# Patient Record
Sex: MALE | Race: WHITE | NOT HISPANIC OR LATINO | URBAN - METROPOLITAN AREA
[De-identification: names, ages, dates, MRNs, and addresses within clinical notes are randomized per-mention and may not be internally consistent; named-entity substitution may affect disease eponyms.]

---

## 2021-04-15 ENCOUNTER — OUTPATIENT (OUTPATIENT)
Dept: OUTPATIENT SERVICES | Facility: HOSPITAL | Age: 51
LOS: 1 days | End: 2021-04-15

## 2021-04-15 VITALS
WEIGHT: 259.93 LBS | OXYGEN SATURATION: 98 % | TEMPERATURE: 98 F | RESPIRATION RATE: 14 BRPM | HEART RATE: 84 BPM | DIASTOLIC BLOOD PRESSURE: 75 MMHG | HEIGHT: 71 IN | SYSTOLIC BLOOD PRESSURE: 110 MMHG

## 2021-04-15 DIAGNOSIS — M75.42 IMPINGEMENT SYNDROME OF LEFT SHOULDER: ICD-10-CM

## 2021-04-15 DIAGNOSIS — M25.512 PAIN IN LEFT SHOULDER: ICD-10-CM

## 2021-04-15 DIAGNOSIS — Z01.812 ENCOUNTER FOR PREPROCEDURAL LABORATORY EXAMINATION: ICD-10-CM

## 2021-04-15 LAB
ANION GAP SERPL CALC-SCNC: 16 MMOL/L — HIGH (ref 7–14)
BUN SERPL-MCNC: 17 MG/DL — SIGNIFICANT CHANGE UP (ref 7–23)
CALCIUM SERPL-MCNC: 10.2 MG/DL — SIGNIFICANT CHANGE UP (ref 8.4–10.5)
CHLORIDE SERPL-SCNC: 102 MMOL/L — SIGNIFICANT CHANGE UP (ref 98–107)
CO2 SERPL-SCNC: 22 MMOL/L — SIGNIFICANT CHANGE UP (ref 22–31)
CREAT SERPL-MCNC: 0.96 MG/DL — SIGNIFICANT CHANGE UP (ref 0.5–1.3)
GLUCOSE SERPL-MCNC: 70 MG/DL — SIGNIFICANT CHANGE UP (ref 70–99)
HCT VFR BLD CALC: 47.3 % — SIGNIFICANT CHANGE UP (ref 39–50)
HGB BLD-MCNC: 15.2 G/DL — SIGNIFICANT CHANGE UP (ref 13–17)
MCHC RBC-ENTMCNC: 29.5 PG — SIGNIFICANT CHANGE UP (ref 27–34)
MCHC RBC-ENTMCNC: 32.1 GM/DL — SIGNIFICANT CHANGE UP (ref 32–36)
MCV RBC AUTO: 91.8 FL — SIGNIFICANT CHANGE UP (ref 80–100)
NRBC # BLD: 0 /100 WBCS — SIGNIFICANT CHANGE UP
NRBC # FLD: 0 K/UL — SIGNIFICANT CHANGE UP
PLATELET # BLD AUTO: 233 K/UL — SIGNIFICANT CHANGE UP (ref 150–400)
POTASSIUM SERPL-MCNC: 3.9 MMOL/L — SIGNIFICANT CHANGE UP (ref 3.5–5.3)
POTASSIUM SERPL-SCNC: 3.9 MMOL/L — SIGNIFICANT CHANGE UP (ref 3.5–5.3)
RBC # BLD: 5.15 M/UL — SIGNIFICANT CHANGE UP (ref 4.2–5.8)
RBC # FLD: 12.1 % — SIGNIFICANT CHANGE UP (ref 10.3–14.5)
SODIUM SERPL-SCNC: 140 MMOL/L — SIGNIFICANT CHANGE UP (ref 135–145)
WBC # BLD: 10.73 K/UL — HIGH (ref 3.8–10.5)
WBC # FLD AUTO: 10.73 K/UL — HIGH (ref 3.8–10.5)

## 2021-04-15 NOTE — H&P PST ADULT - HISTORY OF PRESENT ILLNESS
51y/o male presents for prop eval for Scheduled for left shoulder arthroscopy debridement subacromial decompression distal clavicle resection on 4/27/2021.  Pt state had work related injury on 6/19/2020 to left shoulder.   Dx with impingement syndrome of left shoulder.  States hd physical therapy with minimal improvement. 49y/o male presents for prop eval for Scheduled for left shoulder arthroscopy debridement subacromial decompression distal clavicle resection on 4/27/2021.  Pt state had work related injury on 6/19/2020 to left shoulder.   Dx with impingement syndrome of left shoulder.  States had physical therapy with minimal improvement.

## 2021-04-15 NOTE — H&P PST ADULT - BP NONINVASIVE MEAN (MM HG)
Post Op Patient called for either Mick Whitt or NP Buttery. Patient said that Efren only gave her 6 pills of the Hydrocodone Medication. The pharmacy told the pt that they need AIDEN Santos to get preauthorized for the Hydrocodone medication or they will not fill the medication. Patient said she only has one more pill of Hydrocodone for tonight. [facility] [phone]. Patient tel. [phone]. 86

## 2021-04-15 NOTE — H&P PST ADULT - NSICDXPROBLEM_GEN_ALL_CORE_FT
PROBLEM DIAGNOSES  Problem: Encounter for preprocedure screening laboratory testing for COVID-19  Assessment and Plan: per pt scheduled within 72 hrs of this surgery     Problem: Impingement syndrome of left shoulder  Assessment and Plan: Scheduled for left shoulder arthroscopy debridement subacromial decompression distal clavicle resection on 4/27/2021  Written & verbal preop instructions, gi prophylaxis & surgical soap given  Pt verbalized good understanding.  Teach back done on surgical soap instructions.  OS precautions recommended.  OR booking notified via fax.          PROBLEM DIAGNOSES  Problem: Encounter for preprocedure screening laboratory testing for COVID-19  Assessment and Plan: per pt scheduled within 72 hrs of this surgery     Problem: Impingement syndrome of left shoulder  Assessment and Plan: Scheduled for left shoulder arthroscopy debridement subacromial decompression distal clavicle resection on 4/27/2021  Written & verbal preop instructions, gi prophylaxis & surgical soap given  Pt verbalized good understanding.  Teach back done on surgical soap instructions.  Nery precautions recommended.  OR booking notified via fax.

## 2021-04-15 NOTE — H&P PST ADULT - NEGATIVE GENERAL GENITOURINARY SYMPTOMS
no renal colic/no flank pain L/no flank pain R/no bladder infections/no dysuria/normal urinary frequency

## 2021-04-24 ENCOUNTER — APPOINTMENT (OUTPATIENT)
Dept: DISASTER EMERGENCY | Facility: CLINIC | Age: 51
End: 2021-04-24

## 2021-04-24 ENCOUNTER — LABORATORY RESULT (OUTPATIENT)
Age: 51
End: 2021-04-24

## 2021-04-24 DIAGNOSIS — Z01.818 ENCOUNTER FOR OTHER PREPROCEDURAL EXAMINATION: ICD-10-CM

## 2021-04-26 ENCOUNTER — TRANSCRIPTION ENCOUNTER (OUTPATIENT)
Age: 51
End: 2021-04-26

## 2021-04-26 VITALS
SYSTOLIC BLOOD PRESSURE: 110 MMHG | WEIGHT: 259.93 LBS | TEMPERATURE: 98 F | DIASTOLIC BLOOD PRESSURE: 75 MMHG | RESPIRATION RATE: 14 BRPM | HEART RATE: 84 BPM | OXYGEN SATURATION: 98 % | HEIGHT: 71 IN

## 2021-04-27 ENCOUNTER — OUTPATIENT (OUTPATIENT)
Dept: OUTPATIENT SERVICES | Facility: HOSPITAL | Age: 51
LOS: 1 days | Discharge: HOME | End: 2021-04-27

## 2021-04-27 VITALS
HEART RATE: 78 BPM | DIASTOLIC BLOOD PRESSURE: 60 MMHG | TEMPERATURE: 98 F | SYSTOLIC BLOOD PRESSURE: 106 MMHG | OXYGEN SATURATION: 100 % | RESPIRATION RATE: 17 BRPM

## 2021-04-27 DIAGNOSIS — M25.512 PAIN IN LEFT SHOULDER: ICD-10-CM

## 2021-04-27 RX ORDER — ERGOCALCIFEROL 1.25 MG/1
1 CAPSULE ORAL
Qty: 0 | Refills: 0 | DISCHARGE

## 2021-04-27 NOTE — ASU DISCHARGE PLAN (ADULT/PEDIATRIC) - CARE PROVIDER_API CALL
Ollie Roblero)  Orthopaedic Surgery  [facility] [place] [address]  Phone: [phone]  Fax: [phone]  Follow Up Time:

## 2021-04-27 NOTE — ASU DISCHARGE PLAN (ADULT/PEDIATRIC) - NURSING INSTRUCTIONS
No fried, spicy or greasy foods. Increase fluids as tolerated  If your doctor prescribed narcotic pain medications after your procedure to help prevent and reduce constipation, increase fluid intake and fiber intake in your diet. You may take OTC Stool Softeners such as Colace to help reduce constipation.    NEXT TORADOL DOSE IS AT 10 PM    WHILE TAKING TORADOL DO NOT TAKE ANY OTHER NSAIDS INCLUDING NAPROXEN, MOTRIN, ALEVE, ADVIL

## 2022-03-27 PROBLEM — M75.42 IMPINGEMENT SYNDROME OF LEFT SHOULDER: Chronic | Status: ACTIVE | Noted: 2021-04-15

## 2022-03-27 PROBLEM — E66.9 OBESITY, UNSPECIFIED: Chronic | Status: ACTIVE | Noted: 2021-04-15

## 2022-05-02 ENCOUNTER — APPOINTMENT (OUTPATIENT)
Dept: ORTHOPEDIC SURGERY | Facility: CLINIC | Age: 52
End: 2022-05-02

## 2022-06-27 ENCOUNTER — APPOINTMENT (OUTPATIENT)
Dept: ORTHOPEDIC SURGERY | Facility: CLINIC | Age: 52
End: 2022-06-27
Payer: OTHER MISCELLANEOUS

## 2022-06-27 VITALS — HEIGHT: 71 IN | WEIGHT: 250 LBS | BODY MASS INDEX: 35 KG/M2

## 2022-06-27 DIAGNOSIS — M19.012 PRIMARY OSTEOARTHRITIS, LEFT SHOULDER: ICD-10-CM

## 2022-06-27 DIAGNOSIS — Z78.9 OTHER SPECIFIED HEALTH STATUS: ICD-10-CM

## 2022-06-27 DIAGNOSIS — M75.42 IMPINGEMENT SYNDROME OF LEFT SHOULDER: ICD-10-CM

## 2022-06-27 DIAGNOSIS — S46.012D STRAIN OF MUSCLE(S) AND TENDON(S) OF THE ROTATOR CUFF OF LEFT SHOULDER, SUBSEQUENT ENCOUNTER: ICD-10-CM

## 2022-06-27 DIAGNOSIS — M25.512 PAIN IN LEFT SHOULDER: ICD-10-CM

## 2022-06-27 PROCEDURE — 73010 X-RAY EXAM OF SHOULDER BLADE: CPT | Mod: LT

## 2022-06-27 PROCEDURE — 99455 WORK RELATED DISABILITY EXAM: CPT

## 2022-06-27 PROCEDURE — 99072 ADDL SUPL MATRL&STAF TM PHE: CPT

## 2022-06-27 PROCEDURE — 73030 X-RAY EXAM OF SHOULDER: CPT | Mod: LT

## 2022-06-27 NOTE — ASSESSMENT
[FreeTextEntry1] : .\par We discussed the course.\par The patient has reached MMI.\par Cont HEP reviewed.\par Medication use discussed.\par Short term vs long term issues noted.\par The course of disease progression outlined.\par This includes the likely eventual indication for shoulder replacement.\par Questions were answered.\par F/u is at the patient's request.\par \par Patient seen by Ollie Roblero M.D.\par Entered by Iliana Babin acting as scribe.

## 2022-06-27 NOTE — HISTORY OF PRESENT ILLNESS
[Full time] : Work status: full time [] : yes [de-identified] : pt is here for left shoulder. pain level is the same  [de-identified] : none [FreeTextEntry1] : left shoulder

## 2022-06-27 NOTE — REASON FOR VISIT
[FreeTextEntry2] : WC 6/19/20\par This is a 51 year old RHD male  with left shoulder pain after pulling wire.\par DOS: 4/27/2021\par Procedure: Left Shoulder Arthroscopy, Glenohumeral Debridement, Synovectomy, Subacromial Decompression, Distal\par Clavicle Resection\par Diagnosis: AC Arthralgia, Subacromial Impingement, Shoulder Pain, Glenohumeral Synovitis, Glenohumeral Chondrosis,\par SLAP Tear, Partial Anterior Labral Tear, Partial Subscapularis Tear\par \par His pain is unchanged, if not worse.  There is still stiffness.  He guards the arm.  He takes NSAIDs.

## 2022-06-27 NOTE — WORK
[Has the patient reached Maximum Medical Improvement? If yes, indicate date___] : Yes, on [unfilled] [Is there permanent partial impairment?] : Yes [FreeTextEntry1] : He is WFD. [de-identified] : .\par Left Shoulder Loss of Use = 40%\par 15% = Moderate loss of IR & ER, \par 15% = Moderate loss of adduction & extension, and\par 10% = Loss of distal clavicle.\par \par Repeated objective measures were obtained.

## 2022-06-27 NOTE — PHYSICAL EXAM
[Left] : left shoulder [Moderate] : moderate [Right] : right shoulder [Sitting] : sitting [5 ___] : forward flexion 5[unfilled]/5 [5___] : external rotation 5[unfilled]/5 [] : no swelling [FreeTextEntry9] : Adduction and extension are diminished.  [TWNoteComboBox4] : passive forward flexion 165 degrees [TWNoteComboBox6] : internal rotation L1 [de-identified] : external rotation 75 degrees

## 2023-08-25 NOTE — ASU PREOP CHECKLIST - SPO2 (%)
98 Hydroquinone Counseling:  Patient advised that medication may result in skin irritation, lightening (hypopigmentation), dryness, and burning.  In the event of skin irritation, the patient was advised to reduce the amount of the drug applied or use it less frequently.  Rarely, spots that are treated with hydroquinone can become darker (pseudoochronosis).  Should this occur, patient instructed to stop medication and call the office. The patient verbalized understanding of the proper use and possible adverse effects of hydroquinone.  All of the patient's questions and concerns were addressed.
